# Patient Record
Sex: MALE | Race: WHITE | NOT HISPANIC OR LATINO | Employment: FULL TIME | URBAN - METROPOLITAN AREA
[De-identification: names, ages, dates, MRNs, and addresses within clinical notes are randomized per-mention and may not be internally consistent; named-entity substitution may affect disease eponyms.]

---

## 2025-04-30 NOTE — PROGRESS NOTES
[facility]: [facility]  PROGRESS NOTE      Jeffery Guaman is a 49 y.o. male that is presenting today to establish care with new provider.  He does not remember the last time he saw a provider  states it has been years.  He used to see Dr. Richards.  He would like orders for lab work and colorectal screening.  Has no concerns today    Assessment/Plan   Assessment & Plan  Encounter to establish care with new provider  On supplements   No prescription medications  Labs ordered  Screening for colorectal cancer    Orders:    Cologuard® colon cancer screening; Future    Elevated blood pressure reading    Orders:    Comprehensive Metabolic Panel; Future    CBC; Future    Obesity (BMI 30-39.9)    Orders:    Thyroid Stimulating Hormone; Future    Impaired fasting glucose    Orders:    Hemoglobin A1C; Future    Family history of early CAD    Orders:    Lipid Panel; Future      Subjective   HPI  Review of Systems   Constitutional:  Positive for fatigue.   HENT: Negative.     Respiratory: Negative.     Cardiovascular: Negative.    Skin: Negative.    Neurological: Negative.    Psychiatric/Behavioral: Negative.        Objective   There were no vitals filed for this visit.   There is no height or weight on file to calculate BMI.  Physical Exam  Vitals reviewed.   Cardiovascular:      Rate and Rhythm: Normal rate and regular rhythm.      Heart sounds: Normal heart sounds.   Pulmonary:      Effort: Pulmonary effort is normal.      Breath sounds: Normal breath sounds.   Abdominal:      Palpations: Abdomen is soft.   Skin:     General: Skin is warm and dry.   Neurological:      Mental Status: He is oriented to person, place, and time.   Psychiatric:         Mood and Affect: Mood normal.         Behavior: Behavior normal.         Thought Content: Thought content normal.         Judgment: Judgment normal.       Vitals:    05/01/25 1456   BP: 132/90   Pulse: 84   Temp: 36.1 °C (97 °F)   SpO2: 93%   Repeat bp  "sitting left arm manually 140/68    Diagnostic Results   Lab Results   Component Value Date    GLUCOSE 100 (H) 08/02/2018    CALCIUM 9.2 08/02/2018     08/02/2018    K 4.4 08/02/2018    CO2 28 08/02/2018     08/02/2018    BUN 18 08/02/2018    CREATININE 1.0 08/02/2018     Lab Results   Component Value Date    ALT 17 08/02/2018    AST 20 08/02/2018    ALKPHOS 44 08/02/2018    BILITOT 0.8 08/02/2018     Lab Results   Component Value Date    WBC 7.1 08/02/2018    HGB 14.8 08/02/2018    HCT 43.4 08/02/2018    MCV 90.4 08/02/2018     08/02/2018     Lab Results   Component Value Date    CHOL 187 08/02/2018     Lab Results   Component Value Date    HDL 60 08/02/2018     Lab Results   Component Value Date    LDLCALC 110 08/02/2018     Lab Results   Component Value Date    TRIG 83 08/02/2018     No components found for: \"CHOLHDL\"  No results found for: \"HGBA1C\"  Other labs not included in the list above were reviewed either before or during this encounter.    History    Medical History[1]  Surgical History[2]  Family History[3]  Social History     Socioeconomic History    Marital status:      Spouse name: Not on file    Number of children: Not on file    Years of education: Not on file    Highest education level: Not on file   Occupational History    Not on file   Tobacco Use    Smoking status: Not on file    Smokeless tobacco: Not on file   Substance and Sexual Activity    Alcohol use: Not on file    Drug use: Not on file    Sexual activity: Not on file   Other Topics Concern    Not on file   Social History Narrative    Not on file     Social Drivers of Health     Financial Resource Strain: Not on file   Food Insecurity: Not on file   Transportation Needs: Not on file   Physical Activity: Not on file   Stress: Not on file   Social Connections: Not on file   Intimate Partner Violence: Not on file   Housing Stability: Not on file     Allergies[4]  Medications Ordered Prior to Encounter[5]    There is " no immunization history on file for this patient.  Patient's medical history was reviewed and updated either before or during this encounter.       Nadine Rowland, DEL-CNP         [1] No past medical history on file.  [2] No past surgical history on file.  [3] No family history on file.  [4] Not on File  [5]   No current outpatient medications on file prior to visit.     No current facility-administered medications on file prior to visit.

## 2025-05-01 ENCOUNTER — OFFICE VISIT (OUTPATIENT)
Dept: PRIMARY CARE | Facility: CLINIC | Age: 50
End: 2025-05-01
Payer: COMMERCIAL

## 2025-05-01 VITALS
SYSTOLIC BLOOD PRESSURE: 132 MMHG | WEIGHT: 267 LBS | HEIGHT: 71 IN | DIASTOLIC BLOOD PRESSURE: 90 MMHG | OXYGEN SATURATION: 93 % | TEMPERATURE: 97 F | HEART RATE: 84 BPM | BODY MASS INDEX: 37.38 KG/M2

## 2025-05-01 DIAGNOSIS — R73.01 IMPAIRED FASTING GLUCOSE: ICD-10-CM

## 2025-05-01 DIAGNOSIS — Z12.11 SCREENING FOR COLORECTAL CANCER: ICD-10-CM

## 2025-05-01 DIAGNOSIS — Z12.12 SCREENING FOR COLORECTAL CANCER: ICD-10-CM

## 2025-05-01 DIAGNOSIS — E66.9 OBESITY (BMI 30-39.9): ICD-10-CM

## 2025-05-01 DIAGNOSIS — R03.0 ELEVATED BLOOD PRESSURE READING: ICD-10-CM

## 2025-05-01 DIAGNOSIS — R53.83 FATIGUE, UNSPECIFIED TYPE: ICD-10-CM

## 2025-05-01 DIAGNOSIS — Z76.89 ENCOUNTER TO ESTABLISH CARE WITH NEW PROVIDER: Primary | ICD-10-CM

## 2025-05-01 DIAGNOSIS — Z82.49 FAMILY HISTORY OF EARLY CAD: ICD-10-CM

## 2025-05-01 PROCEDURE — 3008F BODY MASS INDEX DOCD: CPT | Performed by: NURSE PRACTITIONER

## 2025-05-01 PROCEDURE — 99204 OFFICE O/P NEW MOD 45 MIN: CPT | Performed by: NURSE PRACTITIONER

## 2025-05-01 PROCEDURE — 1036F TOBACCO NON-USER: CPT | Performed by: NURSE PRACTITIONER

## 2025-05-01 RX ORDER — UBIQUINOL 100 MG
CAPSULE ORAL EVERY 24 HOURS
COMMUNITY

## 2025-05-01 RX ORDER — GLUCOSAM/CHONDRO/HERB 149/HYAL 750-100 MG
1 TABLET ORAL EVERY 24 HOURS
COMMUNITY

## 2025-05-01 RX ORDER — VIT C/E/ZN/COPPR/LUTEIN/ZEAXAN 250MG-90MG
CAPSULE ORAL EVERY 24 HOURS
COMMUNITY

## 2025-05-01 RX ORDER — GLUC/MSM/COLGN2/HYAL/ANTIARTH3 375-375-20
1 TABLET ORAL ONCE
COMMUNITY
End: 2025-05-01 | Stop reason: WASHOUT

## 2025-05-01 RX ORDER — NAPROXEN SODIUM 220 MG
220 TABLET ORAL EVERY 12 HOURS
COMMUNITY

## 2025-05-01 RX ORDER — TURMERIC 400 MG
CAPSULE ORAL
COMMUNITY

## 2025-05-01 ASSESSMENT — ENCOUNTER SYMPTOMS
NEUROLOGICAL NEGATIVE: 1
PSYCHIATRIC NEGATIVE: 1
CARDIOVASCULAR NEGATIVE: 1
RESPIRATORY NEGATIVE: 1
FATIGUE: 1

## 2025-05-01 ASSESSMENT — PATIENT HEALTH QUESTIONNAIRE - PHQ9
2. FEELING DOWN, DEPRESSED OR HOPELESS: NOT AT ALL
1. LITTLE INTEREST OR PLEASURE IN DOING THINGS: NOT AT ALL
SUM OF ALL RESPONSES TO PHQ9 QUESTIONS 1 AND 2: 0

## 2025-05-01 ASSESSMENT — PAIN SCALES - GENERAL: PAINLEVEL_OUTOF10: 0-NO PAIN

## 2025-05-02 ENCOUNTER — APPOINTMENT (OUTPATIENT)
Dept: PRIMARY CARE | Facility: CLINIC | Age: 50
End: 2025-05-02
Payer: COMMERCIAL

## 2025-05-03 LAB
ALBUMIN SERPL-MCNC: 4.4 G/DL (ref 3.6–5.1)
ALP SERPL-CCNC: 39 U/L (ref 36–130)
ALT SERPL-CCNC: 23 U/L (ref 9–46)
ANION GAP SERPL CALCULATED.4IONS-SCNC: 7 MMOL/L (CALC) (ref 7–17)
AST SERPL-CCNC: 19 U/L (ref 10–40)
BILIRUB SERPL-MCNC: 0.8 MG/DL (ref 0.2–1.2)
BUN SERPL-MCNC: 20 MG/DL (ref 7–25)
CALCIUM SERPL-MCNC: 9.3 MG/DL (ref 8.6–10.3)
CHLORIDE SERPL-SCNC: 102 MMOL/L (ref 98–110)
CHOLEST SERPL-MCNC: 204 MG/DL
CHOLEST/HDLC SERPL: 4.6 (CALC)
CO2 SERPL-SCNC: 31 MMOL/L (ref 20–32)
CREAT SERPL-MCNC: 1.1 MG/DL (ref 0.6–1.29)
EGFRCR SERPLBLD CKD-EPI 2021: 83 ML/MIN/1.73M2
ERYTHROCYTE [DISTWIDTH] IN BLOOD BY AUTOMATED COUNT: 13 % (ref 11–15)
EST. AVERAGE GLUCOSE BLD GHB EST-MCNC: 134 MG/DL
EST. AVERAGE GLUCOSE BLD GHB EST-SCNC: 7.4 MMOL/L
GLUCOSE SERPL-MCNC: 118 MG/DL (ref 65–99)
HBA1C MFR BLD: 6.3 %
HCT VFR BLD AUTO: 43.7 % (ref 38.5–50)
HDLC SERPL-MCNC: 44 MG/DL
HGB BLD-MCNC: 14.7 G/DL (ref 13.2–17.1)
LDLC SERPL CALC-MCNC: 136 MG/DL (CALC)
MCH RBC QN AUTO: 29.1 PG (ref 27–33)
MCHC RBC AUTO-ENTMCNC: 33.6 G/DL (ref 32–36)
MCV RBC AUTO: 86.5 FL (ref 80–100)
NONHDLC SERPL-MCNC: 160 MG/DL (CALC)
PLATELET # BLD AUTO: 270 THOUSAND/UL (ref 140–400)
PMV BLD REES-ECKER: 10.8 FL (ref 7.5–12.5)
POTASSIUM SERPL-SCNC: 5.3 MMOL/L (ref 3.5–5.3)
PROT SERPL-MCNC: 7.1 G/DL (ref 6.1–8.1)
RBC # BLD AUTO: 5.05 MILLION/UL (ref 4.2–5.8)
SODIUM SERPL-SCNC: 140 MMOL/L (ref 135–146)
TRIGL SERPL-MCNC: 121 MG/DL
TSH SERPL-ACNC: 2.17 MIU/L (ref 0.4–4.5)
WBC # BLD AUTO: 7.3 THOUSAND/UL (ref 3.8–10.8)

## 2025-05-17 LAB — NONINV COLON CA DNA+OCC BLD SCRN STL QL: NEGATIVE

## 2025-06-13 ENCOUNTER — APPOINTMENT (OUTPATIENT)
Dept: PRIMARY CARE | Facility: CLINIC | Age: 50
End: 2025-06-13
Payer: COMMERCIAL

## 2025-06-16 RX ORDER — GLUC/MSM/COLGN2/HYAL/ANTIARTH3 375-375-20
1 TABLET ORAL ONCE
COMMUNITY

## 2025-06-16 NOTE — PROGRESS NOTES
[facility]: [facility]  PHYSICAL EXAM      Jeffery Guaman is a 50 y.o. male that is presenting today for annual physical exam.   Has no concerns today    Assessment/Plan   Assessment & Plan  Annual physical exam  Medications and problem list reconciled today  Negative cologuard 5/2/2025  Current with labs - reviewed today with the patient       Elevated blood pressure reading  Bp stable  Avoid salt, salty foods, processed,  and fast foods. Avoid salty snacks, canned foods, lunchmeats and processed meats  Increase daily exercise       Fatigue, unspecified type  Normal cbc  Normal tsh       Prediabetes  A1C 6.3  Avoid white sugars, flours, breads, pastas, sugary foods and drinks, bakery and alcohol         Subjective   HPI  Review of Systems   Constitutional: Negative.    HENT: Negative.     Eyes: Negative.    Respiratory: Negative.     Cardiovascular: Negative.    Endocrine: Negative.    Genitourinary: Negative.    Musculoskeletal: Negative.    Allergic/Immunologic: Negative.    Neurological: Negative.    Psychiatric/Behavioral: Negative.        Objective   Vitals:    06/17/25 1439   BP: 136/87   Pulse: 77   Temp: 36.6 °C (97.8 °F)   SpO2: 97%     Body mass index is 37.34 kg/m².  Physical Exam  Vitals reviewed.   Constitutional:       Appearance: Normal appearance.   HENT:      Head: Normocephalic and atraumatic.      Right Ear: Tympanic membrane and ear canal normal.      Left Ear: Tympanic membrane and ear canal normal.      Nose: Nose normal.      Mouth/Throat:      Mouth: Mucous membranes are moist.      Pharynx: Oropharynx is clear.   Eyes:      Pupils: Pupils are equal, round, and reactive to light.   Cardiovascular:      Rate and Rhythm: Normal rate and regular rhythm.      Pulses: Normal pulses.      Heart sounds: Normal heart sounds.   Pulmonary:      Effort: Pulmonary effort is normal.      Breath sounds: Normal breath sounds.   Abdominal:      General: Bowel sounds are normal.  "  Musculoskeletal:         General: Normal range of motion.      Cervical back: Normal range of motion and neck supple.   Skin:     General: Skin is warm and dry.   Neurological:      General: No focal deficit present.      Mental Status: He is alert and oriented to person, place, and time.   Psychiatric:         Mood and Affect: Mood normal.         Behavior: Behavior normal.         Thought Content: Thought content normal.         Judgment: Judgment normal.       Vitals:    06/17/25 1439   BP: 136/87   Pulse: 77   Temp: 36.6 °C (97.8 °F)   SpO2: 97%   Repeat bp sitting manually left arm 142/80    Diagnostic Results   Lab Results   Component Value Date    GLUCOSE 118 (H) 05/02/2025    CALCIUM 9.3 05/02/2025     05/02/2025    K 5.3 05/02/2025    CO2 31 05/02/2025     05/02/2025    BUN 20 05/02/2025    CREATININE 1.10 05/02/2025     Lab Results   Component Value Date    ALT 23 05/02/2025    AST 19 05/02/2025    ALKPHOS 39 05/02/2025    BILITOT 0.8 05/02/2025     Lab Results   Component Value Date    WBC 7.3 05/02/2025    HGB 14.7 05/02/2025    HCT 43.7 05/02/2025    MCV 86.5 05/02/2025     05/02/2025     Lab Results   Component Value Date    CHOL 204 (H) 05/02/2025    CHOL 187 08/02/2018     Lab Results   Component Value Date    HDL 44 05/02/2025    HDL 60 08/02/2018     Lab Results   Component Value Date    LDLCALC 136 (H) 05/02/2025    LDLCALC 110 08/02/2018     Lab Results   Component Value Date    TRIG 121 05/02/2025    TRIG 83 08/02/2018     No components found for: \"CHOLHDL\"  Lab Results   Component Value Date    HGBA1C 6.3 (H) 05/02/2025     Other labs not included in the list above were reviewed either before or during this encounter.    History   Medical History[1]  Surgical History[2]  Family History[3]  Social History     Socioeconomic History    Marital status:      Spouse name: Not on file    Number of children: Not on file    Years of education: Not on file    Highest education " level: Not on file   Occupational History    Not on file   Tobacco Use    Smoking status: Never     Passive exposure: Never    Smokeless tobacco: Never   Vaping Use    Vaping status: Never Used   Substance and Sexual Activity    Alcohol use: Never    Drug use: Never    Sexual activity: Not on file   Other Topics Concern    Not on file   Social History Narrative    Not on file     Social Drivers of Health     Financial Resource Strain: Not on file   Food Insecurity: Not on file   Transportation Needs: Not on file   Physical Activity: Not on file   Stress: Not on file   Social Connections: Not on file   Intimate Partner Violence: Not on file   Housing Stability: Not on file     Allergies[4]  Medications Ordered Prior to Encounter[5]  Immunization History   Administered Date(s) Administered    COVID-19, mRNA, LNP-S, PF, 30 mcg/0.3 mL dose 03/24/2021, 04/23/2021, 02/14/2022    Influenza, seasonal, injectable 12/05/2012     Patient's medical history was reviewed and updated either before or during this encounter.       Nadine Rowland, APRN-CNP         [1] History reviewed. No pertinent past medical history.  [2] History reviewed. No pertinent surgical history.  [3] No family history on file.  [4]   Allergies  Allergen Reactions    Pollen Extracts Unknown   [5]   Current Outpatient Medications on File Prior to Visit   Medication Sig Dispense Refill    apple cider vinegar 300 mg tablet once every 24 hours.      ASHWAGANDHA EXTRACT ORAL Take by mouth.      glucosamine munguia 2KCl-chondroit 500-400 mg tablet Take 1 tablet by mouth 1 time.      multivit-min/folic/vit K/lycop (ONE DAILY MEN'S 50 PLUS ADV ORAL) once every 24 hours.      naproxen sodium (Aleve) 220 mg tablet Take 1 tablet (220 mg) by mouth every 12 hours.      omega 3-dha-epa-fish oil (Fish OiL) 1,000 (120-180) mg capsule 1 capsule (1,000 mg) once every 24 hours.      turmeric 400 mg capsule Take by mouth.      [DISCONTINUED] glucosamine HCl 750 mg tablet once every  24 hours.       No current facility-administered medications on file prior to visit.

## 2025-06-16 NOTE — ASSESSMENT & PLAN NOTE
Bp stable  Avoid salt, salty foods, processed,  and fast foods. Avoid salty snacks, canned foods, lunchmeats and processed meats  Increase daily exercise

## 2025-06-17 ENCOUNTER — OFFICE VISIT (OUTPATIENT)
Dept: PRIMARY CARE | Facility: CLINIC | Age: 50
End: 2025-06-17
Payer: COMMERCIAL

## 2025-06-17 VITALS
OXYGEN SATURATION: 97 % | HEART RATE: 77 BPM | DIASTOLIC BLOOD PRESSURE: 87 MMHG | WEIGHT: 264 LBS | TEMPERATURE: 97.8 F | SYSTOLIC BLOOD PRESSURE: 136 MMHG | BODY MASS INDEX: 36.96 KG/M2 | HEIGHT: 71 IN

## 2025-06-17 DIAGNOSIS — R53.83 FATIGUE, UNSPECIFIED TYPE: ICD-10-CM

## 2025-06-17 DIAGNOSIS — R73.03 PREDIABETES: ICD-10-CM

## 2025-06-17 DIAGNOSIS — Z00.00 ANNUAL PHYSICAL EXAM: Primary | ICD-10-CM

## 2025-06-17 DIAGNOSIS — R03.0 ELEVATED BLOOD PRESSURE READING: ICD-10-CM

## 2025-06-17 PROCEDURE — 3008F BODY MASS INDEX DOCD: CPT | Performed by: NURSE PRACTITIONER

## 2025-06-17 PROCEDURE — 99396 PREV VISIT EST AGE 40-64: CPT | Performed by: NURSE PRACTITIONER

## 2025-06-17 PROCEDURE — 1036F TOBACCO NON-USER: CPT | Performed by: NURSE PRACTITIONER

## 2025-06-17 ASSESSMENT — ENCOUNTER SYMPTOMS
NEUROLOGICAL NEGATIVE: 1
ALLERGIC/IMMUNOLOGIC NEGATIVE: 1
EYES NEGATIVE: 1
PSYCHIATRIC NEGATIVE: 1
CONSTITUTIONAL NEGATIVE: 1
MUSCULOSKELETAL NEGATIVE: 1
CARDIOVASCULAR NEGATIVE: 1
ENDOCRINE NEGATIVE: 1
RESPIRATORY NEGATIVE: 1

## 2025-06-17 ASSESSMENT — PAIN SCALES - GENERAL: PAINLEVEL_OUTOF10: 0-NO PAIN

## 2025-06-17 ASSESSMENT — PATIENT HEALTH QUESTIONNAIRE - PHQ9
2. FEELING DOWN, DEPRESSED OR HOPELESS: NOT AT ALL
SUM OF ALL RESPONSES TO PHQ9 QUESTIONS 1 AND 2: 0
1. LITTLE INTEREST OR PLEASURE IN DOING THINGS: NOT AT ALL

## 2025-06-17 NOTE — ASSESSMENT & PLAN NOTE
A1C 6.3  Avoid white sugars, flours, breads, pastas, sugary foods and drinks, bakery and alcohol

## 2025-12-16 ENCOUNTER — APPOINTMENT (OUTPATIENT)
Dept: PRIMARY CARE | Facility: CLINIC | Age: 50
End: 2025-12-16
Payer: COMMERCIAL

## 2026-06-23 ENCOUNTER — APPOINTMENT (OUTPATIENT)
Dept: PRIMARY CARE | Facility: CLINIC | Age: 51
End: 2026-06-23
Payer: COMMERCIAL